# Patient Record
Sex: FEMALE | Race: BLACK OR AFRICAN AMERICAN | NOT HISPANIC OR LATINO | URBAN - METROPOLITAN AREA
[De-identification: names, ages, dates, MRNs, and addresses within clinical notes are randomized per-mention and may not be internally consistent; named-entity substitution may affect disease eponyms.]

---

## 2024-01-01 ENCOUNTER — HOSPITAL ENCOUNTER (EMERGENCY)
Facility: HOSPITAL | Age: 24
Discharge: HOME | End: 2024-01-01
Attending: EMERGENCY MEDICINE
Payer: MEDICAID

## 2024-01-01 VITALS
WEIGHT: 155 LBS | OXYGEN SATURATION: 99 % | RESPIRATION RATE: 16 BRPM | SYSTOLIC BLOOD PRESSURE: 111 MMHG | HEART RATE: 92 BPM | TEMPERATURE: 98 F | DIASTOLIC BLOOD PRESSURE: 72 MMHG | HEIGHT: 64 IN | BODY MASS INDEX: 26.46 KG/M2

## 2024-01-01 DIAGNOSIS — F41.9 ANXIETY: ICD-10-CM

## 2024-01-01 DIAGNOSIS — F41.0 PANIC ATTACK: ICD-10-CM

## 2024-01-01 DIAGNOSIS — F32.A DEPRESSION, UNSPECIFIED DEPRESSION TYPE: Primary | ICD-10-CM

## 2024-01-01 LAB
AMPHET+METHAMPHET UR QL: NEGATIVE
B-HCG UR QL: NEGATIVE
BACTERIA #/AREA URNS HPF: ABNORMAL /HPF
BARBITURATES UR QL SCN>200 NG/ML: NEGATIVE
BENZODIAZ UR QL SCN>200 NG/ML: NEGATIVE
BILIRUB UR QL STRIP: NEGATIVE
BZE UR QL SCN: NEGATIVE
CANNABINOIDS UR QL SCN: ABNORMAL
CLARITY UR: ABNORMAL
COLOR UR: YELLOW
CREAT UR-MCNC: 138.2 MG/DL (ref 15–325)
CTP QC/QA: YES
GLUCOSE UR QL STRIP: NEGATIVE
HGB UR QL STRIP: ABNORMAL
KETONES UR QL STRIP: NEGATIVE
LEUKOCYTE ESTERASE UR QL STRIP: ABNORMAL
METHADONE UR QL SCN>300 NG/ML: NEGATIVE
MICROSCOPIC COMMENT: ABNORMAL
NITRITE UR QL STRIP: POSITIVE
OPIATES UR QL SCN: NEGATIVE
PCP UR QL SCN>25 NG/ML: NEGATIVE
PH UR STRIP: 6 [PH] (ref 5–8)
PROT UR QL STRIP: NEGATIVE
RBC #/AREA URNS HPF: 4 /HPF (ref 0–4)
SP GR UR STRIP: 1.02 (ref 1–1.03)
SQUAMOUS #/AREA URNS HPF: 9 /HPF
TOXICOLOGY INFORMATION: ABNORMAL
URN SPEC COLLECT METH UR: ABNORMAL
UROBILINOGEN UR STRIP-ACNC: NEGATIVE EU/DL
WBC #/AREA URNS HPF: 15 /HPF (ref 0–5)

## 2024-01-01 PROCEDURE — 87186 SC STD MICRODIL/AGAR DIL: CPT | Performed by: EMERGENCY MEDICINE

## 2024-01-01 PROCEDURE — 81000 URINALYSIS NONAUTO W/SCOPE: CPT | Mod: 59 | Performed by: EMERGENCY MEDICINE

## 2024-01-01 PROCEDURE — 87088 URINE BACTERIA CULTURE: CPT | Performed by: EMERGENCY MEDICINE

## 2024-01-01 PROCEDURE — 81025 URINE PREGNANCY TEST: CPT | Performed by: EMERGENCY MEDICINE

## 2024-01-01 PROCEDURE — 99284 EMERGENCY DEPT VISIT MOD MDM: CPT

## 2024-01-01 PROCEDURE — 99213 OFFICE O/P EST LOW 20 MIN: CPT | Mod: 95,AF,HB, | Performed by: PSYCHIATRY & NEUROLOGY

## 2024-01-01 PROCEDURE — 87077 CULTURE AEROBIC IDENTIFY: CPT | Performed by: EMERGENCY MEDICINE

## 2024-01-01 PROCEDURE — 87086 URINE CULTURE/COLONY COUNT: CPT | Performed by: EMERGENCY MEDICINE

## 2024-01-01 PROCEDURE — 80307 DRUG TEST PRSMV CHEM ANLYZR: CPT | Performed by: EMERGENCY MEDICINE

## 2024-01-01 RX ORDER — ESCITALOPRAM OXALATE 5 MG/1
5 TABLET ORAL DAILY
Qty: 90 TABLET | Refills: 3 | Status: SHIPPED | OUTPATIENT
Start: 2024-01-01 | End: 2024-12-31

## 2024-01-01 RX ORDER — HYDROXYZINE HYDROCHLORIDE 25 MG/1
25 TABLET, FILM COATED ORAL NIGHTLY PRN
Qty: 30 TABLET | Refills: 3 | Status: SHIPPED | OUTPATIENT
Start: 2024-01-01

## 2024-01-01 NOTE — DISCHARGE INSTRUCTIONS
[facility]    [facility]  (aka [facility], aka [facility])  Serves [facility], and [facility] residents.  Serves uninsured patients & those with Medicaid.  Main location: [address] [place], [place] [id number] [phone]  Walk-in's available during regular business hours.  24/7 Crisis Line: [phone]    [facility]  (aka [facility], aka [facility])  Serves [facility].  Serves uninsured patients, those with Medicaid and some private plans.  Walk-in's available during regular business hours.  Primary care services available as well.  [facility]: [address] [place] [address];  [phone]  [place]: [address] [place] [address];  [phone]  24/7 Crisis Line: [phone]    **[facility]  Serves uninsured patients & those with Medicaid, call for more info.  Primary care, pediatrics, HIV treatment, and dentistry services available as well.  Two locations.  [phone]    [facility]Corporate Office  Serves patients with Medicaid, Medicare, and private insurance  [address]  [place],?[place] [address] [phone]    [facility]  Serves uninsured on a sliding scale, as well as Medicaid, Medicare, and private plans.  Eight locations around the [facility] area.  [phone]    [facility]  Serves uninsured patients & those with Medicaid, private insurances.  Primary care available as well.  [phone]  [address] [place] [address]    [facility]  Serves veterans who were honorably discharged.  [address] [place] [address]  [phone]  24/7 Veterans Crisis Line: [phone] (Press 1)    If you have private insurance and need to find a specialist, please contact your insurance network  to request a list of providers covered by your benefits.

## 2024-01-01 NOTE — ED PROVIDER NOTES
"Encounter Date: 1/1/2024       History     Chief Complaint   Patient presents with    Panic Attack     Pt is crying in triage, states "I think I had a panic attack, It's just a lot of stuff" Pt is tearful stating she does not take her antidepressant every day, She is just upset about "what to do next in life" pt denies SI/HI     22 yo female presents via mother to [facility] s/p episode of crying and nosebleed which patient attributes to panic attack.  Patient was with family this New Year's when she became panicked and tearful.  Patient states her mother became concerned and brought her to the ER.  Patient notes panic attacks are somewhat frequent but not this severe.  She does not usually cry to the point of nosebleeds.  Patient denies SI/HI.  She has not been sleeping well these last 5 days.     Patient was rx'ed Sertraline while in [place] over the summer and had 2 visits with a mental health provider.  She has not since then been taking Sertraline regularly.  She has not followed with a mental health professional.      Patient lives with mother.  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  She previously was in school in [place].      See also MRN [id number].      Patient was on Vyvanse in high school and some college but has not been on it recently.            Review of patient's allergies indicates:  No Known Allergies  Past Medical History:   Diagnosis Date    Depression     Generalized anxiety disorder      History reviewed. No pertinent surgical history.  History reviewed. No pertinent family history.  Social History     Tobacco Use    Smoking status: Never    Smokeless tobacco: Never   Substance Use Topics    Drug use: Yes     Types: Marijuana     Review of Systems   Constitutional:  Negative for fever.   HENT:  Positive for nosebleeds.    Eyes:  Negative for visual disturbance.   Respiratory:  Negative for cough and shortness of breath.    Cardiovascular:  Negative for chest pain. "   Gastrointestinal:  Negative for abdominal pain.   Genitourinary:  Negative for dysuria.   Musculoskeletal:  Negative for gait problem.   Skin:  Negative for rash.   Neurological:  Negative for syncope.   Psychiatric/Behavioral:  Positive for dysphoric mood and sleep disturbance. Negative for suicidal ideas.        Physical Exam     Initial Vitals [01/01/24 0132]   BP Pulse Resp Temp SpO2   127/82 105 (!) 22 98.4 °F (36.9 °C) 99 %      MAP       --         Physical Exam    Nursing note and vitals reviewed.  Constitutional: She appears well-developed and well-nourished. She is not diaphoretic.   Awake, alert, nontoxic, speaking in complete sentences. Tearful. Avoids eye contact.   HENT:   Head: Normocephalic and atraumatic.   Mouth/Throat: Oropharynx is clear and moist.   No evidence of epistaxis.   Eyes: Conjunctivae and EOM are normal. Pupils are equal, round, and reactive to light.   Neck: Neck supple.   Normal range of motion.  Cardiovascular:  Normal rate.           Pulmonary/Chest: No respiratory distress.   Abdominal: She exhibits no distension.   Musculoskeletal:         General: Normal range of motion.      Cervical back: Normal range of motion and neck supple.     Neurological: She is alert and oriented to person, place, and time. She has normal strength.   Moving all extremities.   Skin: Skin is warm and dry. No pallor.   Psychiatric:   Tearful. Avoids eye contact. Denies SI/HI. Not responding to internal stimuli. Appropriate answers to questions.         ED Course   Procedures  Labs Reviewed   URINALYSIS, REFLEX TO URINE CULTURE - Abnormal; Notable for the following components:       Result Value    Appearance, UA Hazy (*)     Occult Blood UA Trace (*)     Nitrite, UA Positive (*)     Leukocytes, UA 1+ (*)     All other components within normal limits    Narrative:     Specimen Source->Urine   DRUG SCREEN PANEL, URINE EMERGENCY - Abnormal; Notable for the following components:    THC Presumptive Positive  (*)     All other components within normal limits    Narrative:     Specimen Source->Urine   URINALYSIS MICROSCOPIC - Abnormal; Notable for the following components:    WBC, UA 15 (*)     Bacteria Many (*)     All other components within normal limits    Narrative:     Specimen Source->Urine   CULTURE, URINE   POCT URINE PREGNANCY          Imaging Results    None          Medications - No data to display  Medical Decision Making  23 y.o. female with depression, panic attack.      Ddx includes acute psychotic episode, SI/danger to self, HI/danger to others, but also toxidrome, withdrawal (eg from EtOH or BZD therapy), electrolyte derangement, serotonin syndrome, unusual pathology like anti-NMDA receptor encephalitis, other.    UPT negative.    Patient was seen and evaluated by tele-psych.  See Dr. Penny Gates's note.  Neither I nor Dr. Gates feel patient is a danger to herself or others.  She does not currently meet criteria for PEC.    I have started patient on Lexapro and PRN hydroxyzine.  We have discussed with patient that she needs to take SSRI daily for benefit.  I have provided mental health resources.  Return precautions discussed.      Amount and/or Complexity of Data Reviewed  Labs: ordered.    Risk  Prescription drug management.                                      Clinical Impression:  Final diagnoses:  [F32.A] Depression, unspecified depression type (Primary)  [F41.9] Anxiety  [F41.0] Panic attack          ED Disposition Condition    Discharge Stable          ED Prescriptions       Medication Sig Dispense Start Date End Date Auth. Provider    EScitalopram oxalate (LEXAPRO) 5 MG Tab Take 1 tablet (5 mg total) by mouth once daily. 90 tablet 1/1/2024 12/31/2024 Dora Lacy MD    hydrOXYzine HCL (ATARAX) 25 MG tablet Take 1 tablet (25 mg total) by mouth nightly as needed (insomnia). This medication causes sedation. If you have trouble sleeping with 1 tablet, you may try 2 tablets at bedtime.  You may experience dry mouth. 30 tablet 1/1/2024 -- Dora Lacy MD          Follow-up Information    None          Dora Lacy MD  01/01/24 1011

## 2024-01-01 NOTE — ED TRIAGE NOTES
Pt AAOX3, non-diaphoretic, crying, minimal eye contact, cooperative, mother at her side, denies SI, HI or any other complaints.

## 2024-01-01 NOTE — CONSULTS
"[facility]  Psychiatry  Telepsychiatry Consult Note    Please see previous notes:    Patient agreeable to consultation via telepsychiatry.    Tele-Consultation from Psychiatry started: 1/1/2024 at 5:10 AM    The chief complaint leading to psychiatric consultation is: panic attack  This consultation was requested by Dr. Lacy, the Emergency Department attending physician.  The location of the consulting psychiatrist is  [place], [place] .  The patient location is  [facility] EMERGENCY DEPARTMENT   The patient arrived at the ED at: 1/1/2024    Also present with the patient at the time of the consultation:  n/a    Patient Identification:   Ellen Cheney is a 23 y.o. female.    Patient information was obtained from patient, relative(s), past medical records, and primary team.  Patient presented voluntarily to the Emergency Department by private vehicle.    Inpatient consult to Telemedicine - Psychiatry  Consult performed by: Penny Gates MD  Consult ordered by: Dora Lacy MD        Teleconsult Time Documentation  Subjective:     History of Present Illness:  Ellen Cheney is a 23 year old woman with past psychiatric history of ADHD and anxiety who presents to the ED  with panic attacks. Psychiatry was consulted to evaluate patient and provide recommendations.      Chart was reviewed. Patient was seen, assessed and staffed with Dr. Lacy, the ED attending.       Patient states "a lot was going on today."  Patient states she became overwhelmed with New Year, had a panic attack and nose bleed. She reports that panic attacks occur weekly, but "not on a large scale."  She states sometimes the panic attacks are triggered, other times they have spontaneously.  Patient states that she previously engaged in treatment while she was living in [place], [place]. She had psychotherapy that she found helpful as well as sertraline prescribed; she states she is unsure if the sertraline was helpful because she " "did not take consistently.  Patient states she recently moved back to [place] from [place] two months ago.  She expresses guilt about decisions she made in life, states these are thoughts that are constantly on her mind.  She reports mood as "I don't know, sometimes a numb feeling" and reports feeling depressed sometimes.  She reports sleep disturbance, as well as excessive worries/anxiety, flashbacks to bad things that has happened in the past ("playing in my head.") She denies SI or history of SA.     Psychiatric History:   Previous Psychiatric Hospitalizations: No   Previous Medication Trials: Yes sertraline  Previous Suicide Attempts: no   Outpatient psychiatrist (current & past): No    Substance Abuse History:  Tobacco:No  Alcohol: drinks twice a week on average  Illicit Substances:cannabis every other day  Detox/Rehab: No    Legal History: Past charges/incarcerations: No     Family Psychiatric History: mother with depression       Social History:  Developmental/Childhood:Achieved all developmental milestones timely  *Education: completed 3.5 years of college  Employment Status/Finances:Employed   Children: 0  Housing Status:  lives at home with mother and younger brother      history:  NO  Access to gun: NO  Nondenominational: denies  Recreational activities: denies    Psychiatric Mental Status Exam:  Arousal: alert  Sensorium/Orientation: oriented to grossly intact  Behavior/Cooperation: cooperative   Speech: normal tone, normal rate, normal pitch, normal volume  Language: grossly intact  Mood: " numb sometimes "   Affect: blunted  Thought Process: goal-directed  Thought Content:   Auditory hallucinations: NO  Visual hallucinations: NO  Paranoia: NO  Delusions:  NO  Suicidal ideation: NO  Homicidal ideation: NO  Attention/Concentration:  intact  Memory:    Recent:  Intact   Remote: Intact     Fund of Knowledge: Vocabulary appropriate    Insight: has awareness of illness  Judgment: behavior is adequate to " circumstances      Past Medical History:   Past Medical History:   Diagnosis Date    Depression     Generalized anxiety disorder       Laboratory Data:   Labs Reviewed   URINALYSIS, REFLEX TO URINE CULTURE - Abnormal; Notable for the following components:       Result Value    Appearance, UA Hazy (*)     Occult Blood UA Trace (*)     Nitrite, UA Positive (*)     Leukocytes, UA 1+ (*)     All other components within normal limits    Narrative:     Specimen Source->Urine   DRUG SCREEN PANEL, URINE EMERGENCY - Abnormal; Notable for the following components:    THC Presumptive Positive (*)     All other components within normal limits    Narrative:     Specimen Source->Urine   URINALYSIS MICROSCOPIC - Abnormal; Notable for the following components:    WBC, UA 15 (*)     Bacteria Many (*)     All other components within normal limits    Narrative:     Specimen Source->Urine   CULTURE, URINE   POCT URINE PREGNANCY       Neurological History:  Seizures: febrile seizures as a child  Head trauma: No    Allergies: reviewed  Review of patient's allergies indicates:  No Known Allergies    Medications in ER: Medications - No data to display    Medications at home: n/a    No new subjective & objective note has been filed under this hospital service since the last note was generated.      Assessment - Diagnosis - Goals:     Diagnosis/Impression:   Panic attack  Unspecified anxiety  Unspecified depression     Patient would benefit from treatment for depression and anxiety. She is not an imminent risk or gravely disable necessitating PEC or hospitalization.      Discussed with patient establishing with mental health provider, to which she expresses interest.  A list of community providers was placed in her discharge instructions.  Also discussed trial of antidepressant medication (escitalopram) to which she agrees, as well as a medication to help with anxiety and sleep (hydroxyzine). Patient has a follow up appointment with her  primary care provider in 19 days, can discuss continuation of these medications if tolerated and helpful then until she can establish with mental health provider.     Rec:     Follow up with primary care  Escitralopram 5 mg #21  Hydroxyzine 25 mg as needed for anxiety and sleep,  #42     Time with patient: 20 min      More than 50% of the time was spent counseling/coordinating care    Consulting clinician was informed of the encounter and consult note.    Consultation ended: 1/1/2024 at 5:56 AM      Penny Gates MD   Psychiatry  [facility]

## 2024-01-03 LAB — BACTERIA UR CULT: ABNORMAL

## 2024-01-03 RX ORDER — NITROFURANTOIN 25; 75 MG/1; MG/1
100 CAPSULE ORAL 2 TIMES DAILY
Qty: 14 CAPSULE | Refills: 0 | Status: SHIPPED | OUTPATIENT
Start: 2024-01-03 | End: 2024-01-10

## 2024-09-30 ENCOUNTER — TELEPHONE (OUTPATIENT)
Dept: FAMILY MEDICINE | Facility: CLINIC | Age: 24
End: 2024-09-30
Payer: MEDICAID

## 2024-09-30 DIAGNOSIS — Z23 FLU VACCINE NEED: Primary | ICD-10-CM

## 2024-09-30 PROCEDURE — 99999PBSHW PR PBB SHADOW TECHNICAL ONLY FILED TO HB: Mod: PBBFAC,,,
